# Patient Record
Sex: FEMALE | Race: BLACK OR AFRICAN AMERICAN | URBAN - METROPOLITAN AREA
[De-identification: names, ages, dates, MRNs, and addresses within clinical notes are randomized per-mention and may not be internally consistent; named-entity substitution may affect disease eponyms.]

---

## 2021-06-22 ENCOUNTER — OFFICE VISIT (OUTPATIENT)
Dept: URBAN - METROPOLITAN AREA CLINIC 23 | Facility: CLINIC | Age: 46
End: 2021-06-22
Payer: MEDICAID

## 2021-06-22 ENCOUNTER — WEB ENCOUNTER (OUTPATIENT)
Dept: URBAN - METROPOLITAN AREA CLINIC 23 | Facility: CLINIC | Age: 46
End: 2021-06-22

## 2021-06-22 DIAGNOSIS — K76.9 LIVER LESION, LEFT LOBE: ICD-10-CM

## 2021-06-22 DIAGNOSIS — K59.09 CHRONIC CONSTIPATION: ICD-10-CM

## 2021-06-22 DIAGNOSIS — K57.92 DIVERTICULITIS: ICD-10-CM

## 2021-06-22 PROBLEM — 197119006: Status: ACTIVE | Noted: 2021-06-22

## 2021-06-22 PROCEDURE — G9903 PT SCRN TBCO ID AS NON USER: HCPCS | Performed by: INTERNAL MEDICINE

## 2021-06-22 PROCEDURE — G9622 NO UNHEAL ETOH USER: HCPCS | Performed by: INTERNAL MEDICINE

## 2021-06-22 PROCEDURE — G8420 CALC BMI NORM PARAMETERS: HCPCS | Performed by: INTERNAL MEDICINE

## 2021-06-22 PROCEDURE — 99204 OFFICE O/P NEW MOD 45 MIN: CPT | Performed by: INTERNAL MEDICINE

## 2021-06-22 PROCEDURE — 3017F COLORECTAL CA SCREEN DOC REV: CPT | Performed by: INTERNAL MEDICINE

## 2021-06-22 PROCEDURE — 1036F TOBACCO NON-USER: CPT | Performed by: INTERNAL MEDICINE

## 2021-06-22 PROCEDURE — G8427 DOCREV CUR MEDS BY ELIG CLIN: HCPCS | Performed by: INTERNAL MEDICINE

## 2021-06-22 RX ORDER — SODIUM, POTASSIUM,MAG SULFATES 17.5-3.13G
17.5-13.3-1.6 GM/177ML SOLUTION, RECONSTITUTED, ORAL ORAL AS DIRECTED
Qty: 354 MILLILITER | OUTPATIENT
Start: 2021-06-22 | End: 2021-06-23

## 2021-06-22 NOTE — PREVIOUS WORKUP REVIEWED
.ENDOSCOPIESLABS-Labs 6/16/2021:WBC 6.4, hemoglobin 13.7, platelet 205, BUN 8, creatinine 0.8, total bilirubin 1, alkaline phosphatase 40, AST 13, ALT 13, total protein 6.8, albumin 3.9, lipase 11.IMAGES-CT abdomen pelvis with contrast 6/16/2021:Short segment concentric wall thickening of the splenic flexure of the colon with trace perienteric stranding, suspicious for mild acute diverticulitis versus colitis versus malignancy. Fatty liver and hepatomegaly. 2 enhancing lesions in the left lobe of liver, indeterminate. Endometrial thickening is suspected in the uterus. Small enhancing focus involving anterior abdominal wall in the midline.

## 2021-06-22 NOTE — HPI-TODAY'S VISIT:
45-year-old -American female presents for left-sided abdominal pain.  She went to ER diagnosed as diverticulitis.  Augmentin started.  She started the medication next day, however her pain is still there without any improvement.  Denies fever.  This is her first time having diverticulitis.  There is a couple of liver lesions.   Her baseline bowel habit is regular, denies constipation.  Currently she is having constipation.

## 2021-06-22 NOTE — PHYSICAL EXAM GASTROINTESTINAL
Abdomen- soft, tender IN LEFT, nondistended , GURGLING SOUND.no guarding or rigidity , no masses palpable.

## 2021-06-24 LAB
BUN/CREATININE RATIO: 10
BUN: 10
CARBON DIOXIDE, TOTAL: 25
CHLORIDE: 102
CREATININE: 1.01
EGFR IF AFRICN AM: 79
EGFR IF NONAFRICN AM: 68
GLUCOSE: 104
HEMATOCRIT: 46.2
HEMOGLOBIN: 14.9
MCH: 27.5
MCHC: 32.3
MCV: 85
NRBC: (no result)
PLATELETS: 267
POTASSIUM: 4.9
RBC: 5.42
RDW: 14.6
SODIUM: 139
WBC: 5.3

## 2021-06-28 ENCOUNTER — TELEPHONE ENCOUNTER (OUTPATIENT)
Dept: URBAN - METROPOLITAN AREA CLINIC 77 | Facility: CLINIC | Age: 46
End: 2021-06-28

## 2021-07-01 ENCOUNTER — LAB OUTSIDE AN ENCOUNTER (OUTPATIENT)
Dept: URBAN - METROPOLITAN AREA CLINIC 77 | Facility: CLINIC | Age: 46
End: 2021-07-01

## 2021-07-02 ENCOUNTER — WEB ENCOUNTER (OUTPATIENT)
Dept: URBAN - METROPOLITAN AREA CLINIC 78 | Facility: CLINIC | Age: 46
End: 2021-07-02

## 2021-07-02 ENCOUNTER — LAB OUTSIDE AN ENCOUNTER (OUTPATIENT)
Dept: URBAN - METROPOLITAN AREA CLINIC 78 | Facility: CLINIC | Age: 46
End: 2021-07-02

## 2021-07-06 ENCOUNTER — TELEPHONE ENCOUNTER (OUTPATIENT)
Dept: URBAN - METROPOLITAN AREA CLINIC 77 | Facility: CLINIC | Age: 46
End: 2021-07-06

## 2021-07-07 ENCOUNTER — TELEPHONE ENCOUNTER (OUTPATIENT)
Dept: URBAN - METROPOLITAN AREA CLINIC 77 | Facility: CLINIC | Age: 46
End: 2021-07-07

## 2021-07-12 ENCOUNTER — OFFICE VISIT (OUTPATIENT)
Dept: URBAN - METROPOLITAN AREA MEDICAL CENTER 27 | Facility: MEDICAL CENTER | Age: 46
End: 2021-07-12

## 2021-08-09 PROBLEM — 307496006: Status: ACTIVE | Noted: 2021-06-22

## 2021-08-20 ENCOUNTER — TELEPHONE ENCOUNTER (OUTPATIENT)
Dept: URBAN - METROPOLITAN AREA CLINIC 23 | Facility: CLINIC | Age: 46
End: 2021-08-20

## 2021-08-20 ENCOUNTER — LAB OUTSIDE AN ENCOUNTER (OUTPATIENT)
Dept: URBAN - METROPOLITAN AREA CLINIC 23 | Facility: CLINIC | Age: 46
End: 2021-08-20

## 2021-08-20 ENCOUNTER — OFFICE VISIT (OUTPATIENT)
Dept: URBAN - METROPOLITAN AREA SURGERY CENTER 15 | Facility: SURGERY CENTER | Age: 46
End: 2021-08-20
Payer: MEDICAID

## 2021-08-20 DIAGNOSIS — K57.32 DIVERTICULITIS LARGE INTESTINE: ICD-10-CM

## 2021-08-20 PROCEDURE — G8907 PT DOC NO EVENTS ON DISCHARG: HCPCS | Performed by: INTERNAL MEDICINE

## 2021-08-20 PROCEDURE — 45378 DIAGNOSTIC COLONOSCOPY: CPT | Performed by: INTERNAL MEDICINE

## 2021-08-24 ENCOUNTER — WEB ENCOUNTER (OUTPATIENT)
Dept: URBAN - METROPOLITAN AREA CLINIC 23 | Facility: CLINIC | Age: 46
End: 2021-08-24

## 2021-08-24 ENCOUNTER — TELEPHONE ENCOUNTER (OUTPATIENT)
Dept: URBAN - METROPOLITAN AREA CLINIC 23 | Facility: CLINIC | Age: 46
End: 2021-08-24

## 2021-09-09 ENCOUNTER — TELEPHONE ENCOUNTER (OUTPATIENT)
Dept: URBAN - METROPOLITAN AREA CLINIC 77 | Facility: CLINIC | Age: 46
End: 2021-09-09

## 2021-09-09 PROBLEM — 300331000: Status: ACTIVE | Noted: 2021-06-22

## 2021-09-16 ENCOUNTER — WEB ENCOUNTER (OUTPATIENT)
Dept: URBAN - METROPOLITAN AREA CLINIC 23 | Facility: CLINIC | Age: 46
End: 2021-09-16

## 2021-12-03 ENCOUNTER — TELEPHONE ENCOUNTER (OUTPATIENT)
Dept: URBAN - METROPOLITAN AREA CLINIC 77 | Facility: CLINIC | Age: 46
End: 2021-12-03

## 2022-03-08 ENCOUNTER — OFFICE VISIT (OUTPATIENT)
Dept: URBAN - METROPOLITAN AREA CLINIC 78 | Facility: CLINIC | Age: 47
End: 2022-03-08

## 2022-03-14 ENCOUNTER — OFFICE VISIT (OUTPATIENT)
Dept: URBAN - METROPOLITAN AREA CLINIC 23 | Facility: CLINIC | Age: 47
End: 2022-03-14
Payer: MEDICAID

## 2022-03-14 ENCOUNTER — DASHBOARD ENCOUNTERS (OUTPATIENT)
Age: 47
End: 2022-03-14

## 2022-03-14 DIAGNOSIS — K60.2 ANAL FISSURE: ICD-10-CM

## 2022-03-14 DIAGNOSIS — K58.1 IRRITABLE BOWEL SYNDROME WITH CONSTIPATION: ICD-10-CM

## 2022-03-14 DIAGNOSIS — K64.2 GRADE III HEMORRHOIDS: ICD-10-CM

## 2022-03-14 PROBLEM — 440630006: Status: ACTIVE | Noted: 2022-03-14

## 2022-03-14 PROCEDURE — 1036F TOBACCO NON-USER: CPT | Performed by: INTERNAL MEDICINE

## 2022-03-14 PROCEDURE — G8427 DOCREV CUR MEDS BY ELIG CLIN: HCPCS | Performed by: INTERNAL MEDICINE

## 2022-03-14 PROCEDURE — G9622 NO UNHEAL ETOH USER: HCPCS | Performed by: INTERNAL MEDICINE

## 2022-03-14 PROCEDURE — 3017F COLORECTAL CA SCREEN DOC REV: CPT | Performed by: INTERNAL MEDICINE

## 2022-03-14 PROCEDURE — 99214 OFFICE O/P EST MOD 30 MIN: CPT | Performed by: INTERNAL MEDICINE

## 2022-03-14 PROCEDURE — G8420 CALC BMI NORM PARAMETERS: HCPCS | Performed by: INTERNAL MEDICINE

## 2022-03-14 RX ORDER — HYDROCORTISONE ACETATE 25 MG/1
1 SUPPOSITORY SUPPOSITORY RECTAL
Qty: 28 SUPPOSITORIES | Refills: 0 | OUTPATIENT
Start: 2022-03-14 | End: 2022-03-28

## 2022-03-14 RX ORDER — DICYCLOMINE HYDROCHLORIDE 20 MG/1
1 TABLET TABLET ORAL
Qty: 90 | Refills: 3 | OUTPATIENT
Start: 2022-03-14 | End: 2022-07-12

## 2022-03-14 NOTE — PHYSICAL EXAM RECTAL:
external hemorrhoids present Skin tags are present. Tenderness on FLASH in the left/post side. Anoscopic exam revealed internal hemorrhoids.

## 2022-03-14 NOTE — HPI-TODAY'S VISIT:
46-year-old female presents for chronic abdominal pain.  Generalized.  She moves bowel every day, 2-3 times a day.  [place] Stool Scale type 1 or 4.  Denies weight loss or gain.  Postprandial fullness and bloating. She also has hemorrhoid issues.  Frequent bleeding per rectum and protrusion.

## 2022-03-14 NOTE — PREVIOUS WORKUP REVIEWED
. , .ENDOSCOPIES-Colonoscopy 8/20/2021: Normal TI.  Normal colon.  Internal hemorrhoids.  Repeat colonoscopy in 10 years.LABS-Labs 6/16/2021:WBC 6.4, hemoglobin 13.7, platelet 205, BUN 8, creatinine 0.8, total bilirubin 1, alkaline phosphatase 40, AST 13, ALT 13, total protein 6.8, albumin 3.9, lipase 11.IMAGES-CT abdomen pelvis 7/2/2021: Previously seen thickening of the splenic flexure of the colon has resolved.  Liver lesions likely representing hemangiomas.-CT abdomen pelvis with contrast 6/16/2021:Short segment concentric wall thickening of the splenic flexure of the colon with trace perienteric stranding, suspicious for mild acute diverticulitis versus colitis versus malignancy. Fatty liver and hepatomegaly. 2 enhancing lesions in the left lobe of liver, indeterminate. Endometrial thickening is suspected in the uterus. Small enhancing focus involving anterior abdominal wall in the midline.